# Patient Record
Sex: FEMALE | Race: WHITE
[De-identification: names, ages, dates, MRNs, and addresses within clinical notes are randomized per-mention and may not be internally consistent; named-entity substitution may affect disease eponyms.]

---

## 2018-01-28 ENCOUNTER — TRANSCRIPTION ENCOUNTER (OUTPATIENT)
Age: 33
End: 2018-01-28

## 2018-08-24 ENCOUNTER — TRANSCRIPTION ENCOUNTER (OUTPATIENT)
Age: 33
End: 2018-08-24

## 2019-11-30 ENCOUNTER — TRANSCRIPTION ENCOUNTER (OUTPATIENT)
Age: 34
End: 2019-11-30

## 2020-10-11 ENCOUNTER — TRANSCRIPTION ENCOUNTER (OUTPATIENT)
Age: 35
End: 2020-10-11

## 2020-11-07 ENCOUNTER — TRANSCRIPTION ENCOUNTER (OUTPATIENT)
Age: 35
End: 2020-11-07

## 2021-02-21 ENCOUNTER — TRANSCRIPTION ENCOUNTER (OUTPATIENT)
Age: 36
End: 2021-02-21

## 2023-12-15 ENCOUNTER — APPOINTMENT (OUTPATIENT)
Dept: OBGYN | Facility: CLINIC | Age: 38
End: 2023-12-15
Payer: COMMERCIAL

## 2023-12-15 VITALS
SYSTOLIC BLOOD PRESSURE: 117 MMHG | HEIGHT: 63 IN | WEIGHT: 154 LBS | BODY MASS INDEX: 27.29 KG/M2 | DIASTOLIC BLOOD PRESSURE: 81 MMHG

## 2023-12-15 DIAGNOSIS — Z78.9 OTHER SPECIFIED HEALTH STATUS: ICD-10-CM

## 2023-12-15 DIAGNOSIS — N20.0 CALCULUS OF KIDNEY: ICD-10-CM

## 2023-12-15 DIAGNOSIS — Z87.19 PERSONAL HISTORY OF OTHER DISEASES OF THE DIGESTIVE SYSTEM: ICD-10-CM

## 2023-12-15 DIAGNOSIS — Z86.018 PERSONAL HISTORY OF OTHER BENIGN NEOPLASM: ICD-10-CM

## 2023-12-15 DIAGNOSIS — Z87.59 PERSONAL HISTORY OF OTHER COMPLICATIONS OF PREGNANCY, CHILDBIRTH AND THE PUERPERIUM: ICD-10-CM

## 2023-12-15 DIAGNOSIS — Z01.419 ENCOUNTER FOR GYNECOLOGICAL EXAMINATION (GENERAL) (ROUTINE) W/OUT ABNORMAL FINDINGS: ICD-10-CM

## 2023-12-15 PROBLEM — Z00.00 ENCOUNTER FOR PREVENTIVE HEALTH EXAMINATION: Status: ACTIVE | Noted: 2023-12-15

## 2023-12-15 PROCEDURE — 99385 PREV VISIT NEW AGE 18-39: CPT

## 2023-12-15 NOTE — PLAN
[FreeTextEntry1] : 38-year-old female for well woman exam  1.  Pap done 2.  Self breast exam reviewed 3.  Patient with history of fibroids.  She will return to the office for a sonogram and follow-up visit to assess her fibroids. 4.  Patient thinking about potentially having 1 more child.  We discussed advanced maternal age.  We discussed her history of ectopic pregnancy and future risk.  We also discussed starting prenatal vitamins.  We reviewed her medications which include famotidine and vitamin D which are safe in pregnancy.  The patient was given the opportunity to ask questions and all were answered to her satisfaction. 5.  Annual exam in 1 year

## 2023-12-15 NOTE — HISTORY OF PRESENT ILLNESS
[FreeTextEntry1] : 38-year-old female presents for well woman exam.  She is a new patient to our practice.  Her last GYN visit was 1.5 years ago.  She has no complaints today.  Menarche was at the age of 12.  Menses are every 26 to 28 days, lasting 3 to 4 days.  She denies heavy bleeding.  Some months she gets cramping which is relieved with ibuprofen and heat.  Her GYN history is significant for a fibroid uterus and uterine polyps.  She has a history of a hysteroscopic myomectomy and polypectomy.  She has no other significant GYN history.  She is currently sexually active and not using anything for contraception.  She is thinking about having another child.  Past medical history is significant for kidney stones and GERD.  Past surgical history includes a cervical fusion, D&C with hysteroscopic polypectomy and myomectomy.  Family history is noncontributory.  Social history is negative x 3.  GYN history as above.  OB history: -0-1-2.  She has a history of 2 full-term vaginal deliveries and an ectopic pregnancy that was treated with methotrexate.  She has no known drug allergies but is allergic to treatment.  She currently takes famotidine and vitamin D.

## 2023-12-18 ENCOUNTER — APPOINTMENT (OUTPATIENT)
Dept: ANTEPARTUM | Facility: CLINIC | Age: 38
End: 2023-12-18

## 2023-12-18 ENCOUNTER — APPOINTMENT (OUTPATIENT)
Dept: OBGYN | Facility: CLINIC | Age: 38
End: 2023-12-18

## 2023-12-22 LAB
CYTOLOGY CVX/VAG DOC THIN PREP: NORMAL
HPV HIGH+LOW RISK DNA PNL CVX: DETECTED

## 2023-12-29 ENCOUNTER — TRANSCRIPTION ENCOUNTER (OUTPATIENT)
Age: 38
End: 2023-12-29

## 2024-08-24 ENCOUNTER — NON-APPOINTMENT (OUTPATIENT)
Age: 39
End: 2024-08-24

## 2024-12-10 ENCOUNTER — APPOINTMENT (OUTPATIENT)
Dept: OBGYN | Facility: CLINIC | Age: 39
End: 2024-12-10
Payer: COMMERCIAL

## 2024-12-10 VITALS
SYSTOLIC BLOOD PRESSURE: 120 MMHG | HEIGHT: 63 IN | BODY MASS INDEX: 27.29 KG/M2 | DIASTOLIC BLOOD PRESSURE: 70 MMHG | WEIGHT: 154 LBS

## 2024-12-10 DIAGNOSIS — R10.2 PELVIC AND PERINEAL PAIN: ICD-10-CM

## 2024-12-10 PROCEDURE — 99214 OFFICE O/P EST MOD 30 MIN: CPT

## 2024-12-16 ENCOUNTER — APPOINTMENT (OUTPATIENT)
Dept: OBGYN | Facility: CLINIC | Age: 39
End: 2024-12-16
Payer: COMMERCIAL

## 2024-12-16 VITALS
DIASTOLIC BLOOD PRESSURE: 70 MMHG | HEIGHT: 63 IN | BODY MASS INDEX: 27.29 KG/M2 | WEIGHT: 154 LBS | SYSTOLIC BLOOD PRESSURE: 110 MMHG

## 2024-12-16 DIAGNOSIS — Z01.419 ENCOUNTER FOR GYNECOLOGICAL EXAMINATION (GENERAL) (ROUTINE) W/OUT ABNORMAL FINDINGS: ICD-10-CM

## 2024-12-16 PROCEDURE — 99395 PREV VISIT EST AGE 18-39: CPT

## 2024-12-23 LAB
CYTOLOGY CVX/VAG DOC THIN PREP: NORMAL
HPV HIGH+LOW RISK DNA PNL CVX: DETECTED

## 2025-01-21 ENCOUNTER — NON-APPOINTMENT (OUTPATIENT)
Age: 40
End: 2025-01-21

## 2025-01-21 ENCOUNTER — APPOINTMENT (OUTPATIENT)
Dept: OBGYN | Facility: CLINIC | Age: 40
End: 2025-01-21
Payer: COMMERCIAL

## 2025-01-21 VITALS
DIASTOLIC BLOOD PRESSURE: 80 MMHG | BODY MASS INDEX: 26.22 KG/M2 | WEIGHT: 148 LBS | HEIGHT: 63 IN | SYSTOLIC BLOOD PRESSURE: 113 MMHG

## 2025-01-21 DIAGNOSIS — Z23 ENCOUNTER FOR IMMUNIZATION: ICD-10-CM

## 2025-01-21 DIAGNOSIS — R87.810 CERVICAL HIGH RISK HUMAN PAPILLOMAVIRUS (HPV) DNA TEST POSITIVE: ICD-10-CM

## 2025-01-21 PROCEDURE — 90471 IMMUNIZATION ADMIN: CPT

## 2025-01-21 PROCEDURE — 57454 BX/CURETT OF CERVIX W/SCOPE: CPT

## 2025-01-21 PROCEDURE — 81025 URINE PREGNANCY TEST: CPT

## 2025-01-21 PROCEDURE — 90651 9VHPV VACCINE 2/3 DOSE IM: CPT

## 2025-01-22 PROBLEM — Z23 ENCOUNTER FOR VACCINATION: Status: ACTIVE | Noted: 2025-01-22

## 2025-01-22 LAB — HCG UR QL: NEGATIVE

## 2025-01-27 LAB — CORE LAB BIOPSY: NORMAL

## 2025-03-21 ENCOUNTER — APPOINTMENT (OUTPATIENT)
Dept: OBGYN | Facility: CLINIC | Age: 40
End: 2025-03-21
Payer: COMMERCIAL

## 2025-03-21 VITALS
BODY MASS INDEX: 26.05 KG/M2 | HEIGHT: 63 IN | WEIGHT: 147 LBS | SYSTOLIC BLOOD PRESSURE: 123 MMHG | DIASTOLIC BLOOD PRESSURE: 85 MMHG

## 2025-03-21 DIAGNOSIS — Z23 ENCOUNTER FOR IMMUNIZATION: ICD-10-CM

## 2025-03-21 PROCEDURE — 90651 9VHPV VACCINE 2/3 DOSE IM: CPT

## 2025-03-21 PROCEDURE — 90471 IMMUNIZATION ADMIN: CPT

## 2025-03-21 PROCEDURE — 99213 OFFICE O/P EST LOW 20 MIN: CPT | Mod: 25

## 2025-03-21 PROCEDURE — 81025 URINE PREGNANCY TEST: CPT

## 2025-03-24 LAB — HCG UR QL: NEGATIVE

## 2025-05-23 ENCOUNTER — APPOINTMENT (OUTPATIENT)
Dept: OBGYN | Facility: CLINIC | Age: 40
End: 2025-05-23